# Patient Record
Sex: FEMALE | Race: OTHER | HISPANIC OR LATINO | URBAN - METROPOLITAN AREA
[De-identification: names, ages, dates, MRNs, and addresses within clinical notes are randomized per-mention and may not be internally consistent; named-entity substitution may affect disease eponyms.]

---

## 2019-11-18 ENCOUNTER — EMERGENCY (EMERGENCY)
Facility: HOSPITAL | Age: 24
LOS: 0 days | Discharge: HOME | End: 2019-11-18
Attending: EMERGENCY MEDICINE
Payer: MEDICAID

## 2019-11-18 VITALS
RESPIRATION RATE: 18 BRPM | SYSTOLIC BLOOD PRESSURE: 108 MMHG | OXYGEN SATURATION: 96 % | TEMPERATURE: 98 F | DIASTOLIC BLOOD PRESSURE: 62 MMHG | HEART RATE: 88 BPM

## 2019-11-18 VITALS — WEIGHT: 115.3 LBS | HEIGHT: 59 IN

## 2019-11-18 DIAGNOSIS — J02.9 ACUTE PHARYNGITIS, UNSPECIFIED: ICD-10-CM

## 2019-11-18 DIAGNOSIS — B34.9 VIRAL INFECTION, UNSPECIFIED: ICD-10-CM

## 2019-11-18 DIAGNOSIS — R07.0 PAIN IN THROAT: ICD-10-CM

## 2019-11-18 LAB — S PYO AG SPEC QL IA: NEGATIVE — SIGNIFICANT CHANGE UP

## 2019-11-18 PROCEDURE — 99284 EMERGENCY DEPT VISIT MOD MDM: CPT

## 2019-11-18 PROCEDURE — 81025 URINE PREGNANCY TEST: CPT

## 2019-11-18 PROCEDURE — 87880 STREP A ASSAY W/OPTIC: CPT

## 2019-11-18 PROCEDURE — 99284 EMERGENCY DEPT VISIT MOD MDM: CPT | Mod: 25

## 2019-11-18 PROCEDURE — 87081 CULTURE SCREEN ONLY: CPT

## 2019-11-18 PROCEDURE — 96375 TX/PRO/DX INJ NEW DRUG ADDON: CPT

## 2019-11-18 PROCEDURE — 96365 THER/PROPH/DIAG IV INF INIT: CPT

## 2019-11-18 RX ORDER — SODIUM CHLORIDE 9 MG/ML
1000 INJECTION INTRAMUSCULAR; INTRAVENOUS; SUBCUTANEOUS ONCE
Refills: 0 | Status: COMPLETED | OUTPATIENT
Start: 2019-11-18 | End: 2019-11-18

## 2019-11-18 RX ORDER — DEXAMETHASONE 0.5 MG/5ML
10 ELIXIR ORAL ONCE
Refills: 0 | Status: COMPLETED | OUTPATIENT
Start: 2019-11-18 | End: 2019-11-18

## 2019-11-18 RX ORDER — KETOROLAC TROMETHAMINE 30 MG/ML
30 SYRINGE (ML) INJECTION ONCE
Refills: 0 | Status: DISCONTINUED | OUTPATIENT
Start: 2019-11-18 | End: 2019-11-18

## 2019-11-18 RX ADMIN — Medication 102 MILLIGRAM(S): at 10:29

## 2019-11-18 RX ADMIN — SODIUM CHLORIDE 1000 MILLILITER(S): 9 INJECTION INTRAMUSCULAR; INTRAVENOUS; SUBCUTANEOUS at 09:56

## 2019-11-18 RX ADMIN — Medication 30 MILLIGRAM(S): at 10:03

## 2019-11-18 RX ADMIN — Medication 10 MILLIGRAM(S): at 11:00

## 2019-11-18 NOTE — ED PROVIDER NOTE - CONSTITUTIONAL, MLM
normal... well developed, well nourished, young  female, alert, no respiratory discomfort +ill appearing but non toxic

## 2019-11-18 NOTE — ED PROVIDER NOTE - OBJECTIVE STATEMENT
25 y/o female with no pertinent PMHx presents to the ED c/o throat pain x2 days. +diffuse body aches. +subjective fever/chills. Sx started with sore throat, then pt started to feel abdominal pain and diffuse body aches. Pain has improved but pt still in mild discomfort, pt is mainly c/o persistent and worsening throat pain. Now pt also experiencing difficulty swallowing and decreased appetite since last night. +headache, +bilateral ear pain. Able to tolerate own secretions and water but with pain. Denies SOB, cough, sinus or nasal congestion. Pt had the flu 3 weeks ago. Has been taking OTC cold medications, cannot recall the name. NKDA. Does not have a PMD. [place]  used, ID#: [id number].

## 2019-11-18 NOTE — ED PROVIDER NOTE - PATIENT PORTAL LINK FT
You can access the FollowMyHealth Patient Portal offered by [facility] by registering at the following website: [email or web address]. By joining [employer]’s FollowMyHealth portal, you will also be able to view your health information using other applications (apps) compatible with our system.

## 2019-11-18 NOTE — ED PROVIDER NOTE - CLINICAL SUMMARY MEDICAL DECISION MAKING FREE TEXT BOX
23 y/o  female, Occitan speaking, ambulatory to ED with 3 days of diffuse body aches, sore throat, subjective fever. Odynophagia, tolerating PO fluids and own secretions. Physical exam non toxic, oropharynx mild erythema, no focal swelling, no exudate, lungs clear. Plan: rapid strep, throat culture, IV fluids, UCG, IV Toradol/Decadron, reassess.

## 2019-11-18 NOTE — ED PROVIDER NOTE - ENMT, MLM
Airway patent, Nasal mucosa clear. Mouth with normal mucosa. Throat has no vesicles, and uvula is midline. Tongue and lips normal, no swelling. Bilateral TMs normal. normal voice. +Oropharynx erythematous, no exudate or focal swelling +mucous membranes mildly dry, pt tolerating own secretions well

## 2019-11-18 NOTE — ED PROVIDER NOTE - CHPI ED SYMPTOMS POS
THROAT PAIN/EAR PAIN/+diffuse body aches +subjective fever +chills +difficulty swallowing +decreased appetite/HEADACHE

## 2019-11-18 NOTE — ED ADULT NURSE NOTE - OBJECTIVE STATEMENT
pt presents to Ed from home, pt alert and orientedx4, VSS afebrile. pt c/o sore throat, fever, abdominal, back and neck and shoulder pain for 3 days. with chills and fever. pt denies cough congestion urinary symptoms, NVD. safety maintained will continue to montior

## 2019-11-18 NOTE — ED ADULT TRIAGE NOTE - CHIEF COMPLAINT QUOTE
Patient comes in with throat pain x 3 days. patient endorses difficulty swallowing but denies sob. patient states she has been experiencing generalized weakness and lower abdominal pain. patient denies fever/chills/nausea/vomiting/diarrhea. no signs of acute distress noted.

## 2019-11-20 LAB
CULTURE RESULTS: SIGNIFICANT CHANGE UP
SPECIMEN SOURCE: SIGNIFICANT CHANGE UP

## 2024-08-13 NOTE — ED ADULT NURSE NOTE - GASTROINTESTINAL WDL
